# Patient Record
Sex: FEMALE | Race: WHITE | NOT HISPANIC OR LATINO | URBAN - METROPOLITAN AREA
[De-identification: names, ages, dates, MRNs, and addresses within clinical notes are randomized per-mention and may not be internally consistent; named-entity substitution may affect disease eponyms.]

---

## 2019-04-11 ENCOUNTER — OFFICE VISIT - RIVER FALLS (OUTPATIENT)
Dept: FAMILY MEDICINE | Facility: CLINIC | Age: 34
End: 2019-04-11

## 2019-04-11 ASSESSMENT — MIFFLIN-ST. JEOR: SCORE: 1966.82

## 2022-02-11 VITALS
WEIGHT: 259 LBS | TEMPERATURE: 97.8 F | DIASTOLIC BLOOD PRESSURE: 68 MMHG | SYSTOLIC BLOOD PRESSURE: 114 MMHG | HEART RATE: 70 BPM | BODY MASS INDEX: 35.08 KG/M2 | HEIGHT: 72 IN

## 2022-02-16 NOTE — NURSING NOTE
Comprehensive Intake Entered On:  4/11/2019 2:11 PM CDT    Performed On:  4/11/2019 2:05 PM CDT by Yamileth Lopez LPN               Summary   Chief Complaint :   pre employment physical for [employer].   Weight Measured :   259 lb(Converted to: 259 lb 0 oz, 117.48 kg)    Height Measured :   72 in(Converted to: 6 ft 0 in, 182.88 cm)    Body Mass Index :   35.12 kg/m2 (HI)    Body Surface Area :   2.44 m2   Systolic Blood Pressure :   114 mmHg   Diastolic Blood Pressure :   68 mmHg   Mean Arterial Pressure :   83 mmHg   Peripheral Pulse Rate :   70 bpm   BP Site :   Right arm   Pulse Site :   Radial artery   BP Method :   Manual   HR Method :   Manual   Temperature Tympanic :   97.8 DegF(Converted to: 36.6 DegC)  (LOW)    Yamileth Lopez LPN - 4/11/2019 2:05 PM CDT   Health Status   Allergies Verified? :   Yes   Medication History Verified? :   Yes   Pre-Visit Planning Status :   Completed   Tobacco Use? :   Never smoker   Yamileth Loepz LPN - 4/11/2019 2:05 PM CDT   Consents   Consent for Immunization Exchange :   Consent Granted   Consent for Immunizations to Providers :   Consent Granted   Yamileth Lopez LPN - 4/11/2019 2:05 PM CDT   Meds / Allergies   (As Of: 4/11/2019 2:11:42 PM CDT)   Allergies (Active)   No Known Medication Allergies  Estimated Onset Date:   Unspecified ; Created By:   Yamileth Lopez LPN; Reaction Status:   Active ; Category:   Drug ; Substance:   No Known Medication Allergies ; Type:   Allergy ; Updated By:   Yamileth Lopez LPN; Reviewed Date:   4/11/2019 2:08 PM CDT        Medication List   (As Of: 4/11/2019 2:11:42 PM CDT)   No Known Home Medications     Yamileth Lopez LPN - 4/11/2019 2:08:56 PM

## 2022-02-16 NOTE — PROGRESS NOTES
Patient:   AZAEL PRAKASH            MRN: [id number]            FIN: [id number]               Age:   33 years     Sex:  Female     :  1985   Associated Diagnoses:   Pre-employment examination   Author:   Gilbert Zurita MD      Visit Information      Date of Service: 2019 01:59 pm  Performing Location: [facility]  Encounter#: [id number]      Primary Care Provider (PCP):  NONE ,       Chief Complaint   2019 2:05 PM CDT    pre employment physical for [employer].        History of Present Illness             The patient presents for preemployment screening for [employer].  The patient's general health status is described as good.  Associated symptoms consist of none.        Review of Systems   Constitutional:  No fever, No chills, No sweats, No weakness, No fatigue.    Eye:  No recent visual problem.    Ear/Nose/Mouth/Throat:  No decreased hearing, No nasal congestion, No sore throat.    Respiratory:  No shortness of breath, No cough.    Cardiovascular:  Negative, No chest pain, No palpitations, No peripheral edema.    Gastrointestinal:  No nausea, No vomiting, No diarrhea, No constipation, No heartburn.    Genitourinary:  No dysuria, No change in urine stream.    Hematology/Lymphatics:  No bruising tendency, No bleeding tendency.    Endocrine:  No cold intolerance, No heat intolerance.    Immunologic:  Negative.    Musculoskeletal:  No back pain, No neck pain, No joint pain, No muscle pain.    Integumentary:  No rash, No dryness, No skin lesion.    Neurologic:  Alert and oriented X4, No headache.    Psychiatric:  No anxiety, No depression.       Health Status   Allergies:    Allergic Reactions (Selected)  No Known Medication Allergies   Problem list:    All Problems  Obesity / SNOMED CT [id number] / Probable      Histories   Past Medical History:    No active or resolved past medical history items have been selected or recorded.   Family History:    No family history  items have been selected or recorded.   Procedure history:    No active procedure history items have been selected or recorded.   Social History:             No active social history items have been recorded.      Physical Examination   Vital Signs   4/11/2019 2:05 PM CDT Temperature Tympanic 97.8 DegF  LOW    Peripheral Pulse Rate 70 bpm    Pulse Site Radial artery    HR Method Manual    Systolic Blood Pressure 114 mmHg    Diastolic Blood Pressure 68 mmHg    Mean Arterial Pressure 83 mmHg    BP Site Right arm    BP Method Manual      Measurements from flowsheet : Measurements   4/11/2019 2:05 PM CDT Height Measured - Standard 72 in    Weight Measured - Standard 259 lb    BSA 2.44 m2    Body Mass Index 35.12 kg/m2  HI      General:  Alert and oriented, No acute distress.    Eye:  Pupils are equal, round and reactive to light, Extraocular movements are intact, Normal conjunctiva.    HENT:  Normocephalic, Tympanic membranes are clear, Oral mucosa is moist, No pharyngeal erythema, No sinus tenderness.    Neck:  Supple, Non-tender, No carotid bruit, No lymphadenopathy, No thyromegaly.    Respiratory:  Lungs are clear to auscultation, Respirations are non-labored, Breath sounds are equal, No chest wall tenderness.    Cardiovascular:  Normal rate, Regular rhythm, No murmur, No gallop, Normal peripheral perfusion, No edema.    Gastrointestinal:  Soft, Non-tender, No organomegaly.    Musculoskeletal:  Normal range of motion, Normal strength, No swelling, No deformity.    Integumentary:  Warm, Dry, No rash.    Neurologic:  Alert, Oriented, No focal deficits.    Psychiatric:  Cooperative, Appropriate mood & affect.       Impression and Plan   Diagnosis     Pre-employment examination ([id number] Z02.1).     Course:  Progressing as expected.    Plan:  forms filled out and signed.

## 2024-10-28 PROCEDURE — 88305 TISSUE EXAM BY PATHOLOGIST: CPT | Mod: 26 | Performed by: PATHOLOGY

## 2024-10-28 PROCEDURE — 88305 TISSUE EXAM BY PATHOLOGIST: CPT | Mod: TC,ORL | Performed by: SURGERY

## 2024-10-29 ENCOUNTER — LAB REQUISITION (OUTPATIENT)
Dept: LAB | Facility: CLINIC | Age: 39
End: 2024-10-29

## 2024-10-31 LAB
PATH REPORT.COMMENTS IMP SPEC: NORMAL
PATH REPORT.COMMENTS IMP SPEC: NORMAL
PATH REPORT.FINAL DX SPEC: NORMAL
PATH REPORT.GROSS SPEC: NORMAL
PATH REPORT.MICROSCOPIC SPEC OTHER STN: NORMAL
PATH REPORT.RELEVANT HX SPEC: NORMAL
PHOTO IMAGE: NORMAL